# Patient Record
Sex: FEMALE | Race: OTHER | Employment: UNEMPLOYED | URBAN - METROPOLITAN AREA
[De-identification: names, ages, dates, MRNs, and addresses within clinical notes are randomized per-mention and may not be internally consistent; named-entity substitution may affect disease eponyms.]

---

## 2018-10-18 RX ORDER — CLOPIDOGREL BISULFATE 75 MG/1
75 TABLET ORAL DAILY
COMMUNITY

## 2018-10-18 RX ORDER — ACETAMINOPHEN 160 MG
1000 TABLET,DISINTEGRATING ORAL DAILY
COMMUNITY

## 2018-10-18 RX ORDER — NITROGLYCERIN 0.4 MG/1
0.4 TABLET SUBLINGUAL EVERY 5 MIN PRN
COMMUNITY

## 2018-10-18 RX ORDER — ATORVASTATIN CALCIUM 40 MG/1
20 TABLET, FILM COATED ORAL DAILY
COMMUNITY

## 2018-10-18 RX ORDER — OMEPRAZOLE 20 MG/1
20 CAPSULE, DELAYED RELEASE ORAL DAILY
COMMUNITY

## 2018-10-18 RX ORDER — AMIODARONE HYDROCHLORIDE 200 MG/1
200 TABLET ORAL DAILY
COMMUNITY

## 2018-10-18 RX ORDER — FUROSEMIDE 40 MG/1
40 TABLET ORAL DAILY
COMMUNITY

## 2018-10-22 ENCOUNTER — ANESTHESIA EVENT (OUTPATIENT)
Dept: OPERATING ROOM | Age: 68
End: 2018-10-22
Payer: MEDICARE

## 2018-10-23 ENCOUNTER — HOSPITAL ENCOUNTER (OUTPATIENT)
Age: 68
Setting detail: OUTPATIENT SURGERY
Discharge: HOME | End: 2018-10-23
Attending: OPHTHALMOLOGY | Admitting: OPHTHALMOLOGY
Payer: MEDICARE

## 2018-10-23 ENCOUNTER — ANESTHESIA (OUTPATIENT)
Dept: OPERATING ROOM | Age: 68
End: 2018-10-23
Payer: MEDICARE

## 2018-10-23 VITALS
BODY MASS INDEX: 41.82 KG/M2 | HEART RATE: 69 BPM | WEIGHT: 236 LBS | DIASTOLIC BLOOD PRESSURE: 64 MMHG | RESPIRATION RATE: 16 BRPM | HEIGHT: 63 IN | SYSTOLIC BLOOD PRESSURE: 121 MMHG | OXYGEN SATURATION: 97 % | TEMPERATURE: 97 F

## 2018-10-23 VITALS — OXYGEN SATURATION: 99 % | SYSTOLIC BLOOD PRESSURE: 138 MMHG | DIASTOLIC BLOOD PRESSURE: 66 MMHG

## 2018-10-23 LAB
EKG ATRIAL RATE: 71 BPM
EKG P AXIS: 49 DEGREES
EKG P-R INTERVAL: 176 MS
EKG Q-T INTERVAL: 438 MS
EKG QRS DURATION: 104 MS
EKG QTC CALCULATION (BAZETT): 475 MS
EKG R AXIS: -32 DEGREES
EKG T AXIS: 36 DEGREES
EKG VENTRICULAR RATE: 71 BPM
GLUCOSE BLD-MCNC: 109 MG/DL (ref 74–100)
GLUCOSE BLD-MCNC: 99 MG/DL (ref 65–105)
POC POTASSIUM: 4.2 MMOL/L (ref 3.5–4.5)

## 2018-10-23 PROCEDURE — 2580000003 HC RX 258: Performed by: ANESTHESIOLOGY

## 2018-10-23 PROCEDURE — 7100000041 HC SPAR PHASE II RECOVERY - ADDTL 15 MIN: Performed by: OPHTHALMOLOGY

## 2018-10-23 PROCEDURE — 6360000002 HC RX W HCPCS: Performed by: OPHTHALMOLOGY

## 2018-10-23 PROCEDURE — 82947 ASSAY GLUCOSE BLOOD QUANT: CPT

## 2018-10-23 PROCEDURE — 6370000000 HC RX 637 (ALT 250 FOR IP): Performed by: OPHTHALMOLOGY

## 2018-10-23 PROCEDURE — 3600000014 HC SURGERY LEVEL 4 ADDTL 15MIN: Performed by: OPHTHALMOLOGY

## 2018-10-23 PROCEDURE — 6360000002 HC RX W HCPCS: Performed by: NURSE ANESTHETIST, CERTIFIED REGISTERED

## 2018-10-23 PROCEDURE — 7100000040 HC SPAR PHASE II RECOVERY - FIRST 15 MIN: Performed by: OPHTHALMOLOGY

## 2018-10-23 PROCEDURE — 2500000003 HC RX 250 WO HCPCS: Performed by: OPHTHALMOLOGY

## 2018-10-23 PROCEDURE — 93005 ELECTROCARDIOGRAM TRACING: CPT

## 2018-10-23 PROCEDURE — 2500000003 HC RX 250 WO HCPCS: Performed by: NURSE ANESTHETIST, CERTIFIED REGISTERED

## 2018-10-23 PROCEDURE — 3700000001 HC ADD 15 MINUTES (ANESTHESIA): Performed by: OPHTHALMOLOGY

## 2018-10-23 PROCEDURE — 2709999900 HC NON-CHARGEABLE SUPPLY: Performed by: OPHTHALMOLOGY

## 2018-10-23 PROCEDURE — 3700000000 HC ANESTHESIA ATTENDED CARE: Performed by: OPHTHALMOLOGY

## 2018-10-23 PROCEDURE — 84132 ASSAY OF SERUM POTASSIUM: CPT

## 2018-10-23 PROCEDURE — 3600000004 HC SURGERY LEVEL 4 BASE: Performed by: OPHTHALMOLOGY

## 2018-10-23 RX ORDER — LIDOCAINE HYDROCHLORIDE 10 MG/ML
INJECTION, SOLUTION INFILTRATION; PERINEURAL PRN
Status: DISCONTINUED | OUTPATIENT
Start: 2018-10-23 | End: 2018-10-23 | Stop reason: SDUPTHER

## 2018-10-23 RX ORDER — ERYTHROMYCIN 5 MG/G
OINTMENT OPHTHALMIC PRN
Status: DISCONTINUED | OUTPATIENT
Start: 2018-10-23 | End: 2018-10-23 | Stop reason: HOSPADM

## 2018-10-23 RX ORDER — BUPIVACAINE HYDROCHLORIDE 7.5 MG/ML
INJECTION, SOLUTION EPIDURAL; RETROBULBAR PRN
Status: DISCONTINUED | OUTPATIENT
Start: 2018-10-23 | End: 2018-10-23 | Stop reason: HOSPADM

## 2018-10-23 RX ORDER — PROPOFOL 10 MG/ML
INJECTION, EMULSION INTRAVENOUS PRN
Status: DISCONTINUED | OUTPATIENT
Start: 2018-10-23 | End: 2018-10-23 | Stop reason: SDUPTHER

## 2018-10-23 RX ORDER — DEXAMETHASONE SODIUM PHOSPHATE 10 MG/ML
INJECTION INTRAMUSCULAR; INTRAVENOUS PRN
Status: DISCONTINUED | OUTPATIENT
Start: 2018-10-23 | End: 2018-10-23 | Stop reason: HOSPADM

## 2018-10-23 RX ORDER — CYCLOPENTOLATE HYDROCHLORIDE 10 MG/ML
1 SOLUTION/ DROPS OPHTHALMIC EVERY 5 MIN PRN
Status: COMPLETED | OUTPATIENT
Start: 2018-10-23 | End: 2018-10-23

## 2018-10-23 RX ORDER — CEFAZOLIN SODIUM 1 G/3ML
INJECTION, POWDER, FOR SOLUTION INTRAMUSCULAR; INTRAVENOUS PRN
Status: DISCONTINUED | OUTPATIENT
Start: 2018-10-23 | End: 2018-10-23 | Stop reason: HOSPADM

## 2018-10-23 RX ORDER — SODIUM CHLORIDE, SODIUM LACTATE, POTASSIUM CHLORIDE, CALCIUM CHLORIDE 600; 310; 30; 20 MG/100ML; MG/100ML; MG/100ML; MG/100ML
INJECTION, SOLUTION INTRAVENOUS CONTINUOUS
Status: DISCONTINUED | OUTPATIENT
Start: 2018-10-23 | End: 2018-10-23 | Stop reason: HOSPADM

## 2018-10-23 RX ORDER — CYCLOPENTOLATE HYDROCHLORIDE 10 MG/ML
SOLUTION/ DROPS OPHTHALMIC PRN
Status: DISCONTINUED | OUTPATIENT
Start: 2018-10-23 | End: 2018-10-23 | Stop reason: HOSPADM

## 2018-10-23 RX ORDER — LIDOCAINE HYDROCHLORIDE 20 MG/ML
INJECTION, SOLUTION INFILTRATION; PERINEURAL PRN
Status: DISCONTINUED | OUTPATIENT
Start: 2018-10-23 | End: 2018-10-23 | Stop reason: HOSPADM

## 2018-10-23 RX ORDER — PHENYLEPHRINE HYDROCHLORIDE 100 MG/ML
1 SOLUTION/ DROPS OPHTHALMIC EVERY 5 MIN PRN
Status: COMPLETED | OUTPATIENT
Start: 2018-10-23 | End: 2018-10-23

## 2018-10-23 RX ADMIN — PHENYLEPHRINE HYDROCHLORIDE 1 DROP: 100 SOLUTION/ DROPS OPHTHALMIC at 09:58

## 2018-10-23 RX ADMIN — SODIUM CHLORIDE, POTASSIUM CHLORIDE, SODIUM LACTATE AND CALCIUM CHLORIDE: 600; 310; 30; 20 INJECTION, SOLUTION INTRAVENOUS at 11:09

## 2018-10-23 RX ADMIN — PHENYLEPHRINE HYDROCHLORIDE 1 DROP: 100 SOLUTION/ DROPS OPHTHALMIC at 10:01

## 2018-10-23 RX ADMIN — CYCLOPENTOLATE HYDROCHLORIDE 1 DROP: 10 SOLUTION/ DROPS OPHTHALMIC at 10:08

## 2018-10-23 RX ADMIN — GENTAMICIN, PREDNISOLONE ACETATE 1 DROP: 3; 10 SUSPENSION/ DROPS OPHTHALMIC at 09:56

## 2018-10-23 RX ADMIN — CYCLOPENTOLATE HYDROCHLORIDE 1 DROP: 10 SOLUTION/ DROPS OPHTHALMIC at 09:59

## 2018-10-23 RX ADMIN — PHENYLEPHRINE HYDROCHLORIDE 1 DROP: 100 SOLUTION/ DROPS OPHTHALMIC at 10:05

## 2018-10-23 RX ADMIN — SODIUM CHLORIDE, POTASSIUM CHLORIDE, SODIUM LACTATE AND CALCIUM CHLORIDE: 600; 310; 30; 20 INJECTION, SOLUTION INTRAVENOUS at 09:56

## 2018-10-23 RX ADMIN — CYCLOPENTOLATE HYDROCHLORIDE 1 DROP: 10 SOLUTION/ DROPS OPHTHALMIC at 10:04

## 2018-10-23 RX ADMIN — PROPOFOL 50 MG: 10 INJECTION, EMULSION INTRAVENOUS at 11:17

## 2018-10-23 RX ADMIN — LIDOCAINE HYDROCHLORIDE 40 MG: 10 INJECTION, SOLUTION INFILTRATION; PERINEURAL at 11:17

## 2018-10-23 ASSESSMENT — PULMONARY FUNCTION TESTS
PIF_VALUE: 0
PIF_VALUE: 3
PIF_VALUE: 0

## 2018-10-23 ASSESSMENT — PAIN SCALES - GENERAL
PAINLEVEL_OUTOF10: 0

## 2018-10-23 ASSESSMENT — PAIN - FUNCTIONAL ASSESSMENT: PAIN_FUNCTIONAL_ASSESSMENT: 0-10

## 2018-10-23 NOTE — PROGRESS NOTES
Patient has sisters with her that drove separate. Please call Ary Cornejo [phone] 30 minutes prior to patient needing picked up.  Patient lives with sister

## 2018-10-23 NOTE — BRIEF OP NOTE
Brief Postoperative Note  ______________________________________________________________    Patient: Jose Antonio Mei  YOB: 1950  MRN: [id number]  Date of Procedure: 10/23/2018    Pre-Op Diagnosis: VITREOUS HEMORRHAGE LEFT EYE     Post-Op Diagnosis: Same       Procedure(s):  VITRECTOMY 25 GAUGE PANRETINAL LASER, AIR FLUID EXCHANGE, ENDOLASER 200 MS, 200 MW, 1375 SPOTS    Anesthesia: Monitor Anesthesia Care    Surgeon(s):  Olimpia Vázquez MD    Staff:  Karma Scrub: Roma Perry  Scrub Person First: Mario Thomas  Scrub Person Second: Bryan Wallace; Darroll Dandy, RN    Complications: none    Olimpia Vázquez MD  Date: 10/23/2018  Time: 12:34 PM

## 2018-10-23 NOTE — H&P
History and Physical    Pt Name: Soheila Rendon  MRN: [id number]  YOB: 1950  Date of evaluation: 10/23/2018  Primary Care Physician: No primary care provider on file. Patient evaluated at the request of  Dr. Cristine Kussmaul    Reason for evaluation: left eye vitreous hemorrhage  SUBJECTIVE:   History of Chief Complaint:      Omid Goodman is a 79 y.o. female          who presents with a hx of visual  changes that started approx   2 weeks ago in    Her left   eye ,   DM x  5 yrs , sees a blob of blood in her left eye she reports           Past Medical History      has a past medical history of Acute renal failure (ARF) ([address] Anemia; Arthritis; Atrial fibrillation ([address] CAD (coronary artery disease); Cataract; Chronic renal failure; Diabetes mellitus ([address] Full dentures; Hypercholesterolemia; Hyperkalemia; Hypertensive disorder; Neck sprain; Pancreatitis; Streptococcal bacteremia; Vision changes; and Wears glasses. Past Surgical History   has a past surgical history that includes Cataract removal (Bilateral); eye surgery; and Cardiac catheterization (2017). Medications   Scheduled Meds:  Continuous Infusions:  PRN Meds:. Allergies  is allergic to vancomycin. Family History    family history includes Diabetes in her maternal grandmother. Family Status   Relation Status    MGM (Not Specified)         Social History  Social History     Social History    Marital status:      Spouse name: N/A    Number of children: N/A    Years of education: N/A     Occupational History    Not on file. Social History Main Topics    Smoking status: Never Smoker    Smokeless tobacco: Never Used    Alcohol use No    Drug use: No    Sexual activity: Not on file     Other Topics Concern    Not on file     Social History Narrative    No narrative on file               Hobbies:  Is a grandmother     OBJECTIVE:   VITALS:  height is 5' 3\" (1.6 m) and weight is 236 lb (107 kg).      CONSTITUTIONAL:

## 2018-10-24 NOTE — OP NOTE
lidocaine  and Marcaine was given beneath the left eye as a retrobulbar block. This resulted in excellent anesthesia and akinesia. The left eye was  prepped and draped for usual sterile surgery. A lid speculum was  placed. Peritomies were performed 3.5 mm posterior to the limbus  using bevelled cannulas. The inferotemporal cannula received the  infusion line which was found to be well located in the vitreous  chamber before turning the infusion on. The two superior cannulas  were used for the light pipe and the vitrector. These instruments  were introduced into the eye and the vitreous was noted to be  extremely hazy with white and red blood. The vitreous chamber was  obviously filled for quite sometime with blood based on the color and  organization of the vitreous which was quite opaque and thickened. The vitreous was trimmed out to the periphery as best as possible  leaving a white opacified vitreous skirt. Attention was drawn to the posterior pole where there was red blood  layered on the macula and optic nerve. This was suctioned off without  difficulty. There was an adhesive pigmented old clot versus membrane  that was vacuumed off the surface of the inferior temporal arcade  vessels. There was a suspicion of neovascularization on the optic  disc on inspection with the contact lens after removing blood. There  was also an area of vascular elevation in the superotemporal mid  periphery that appeared to be possible neovascularization, but there  was a bit of blood caked on its surface. This was surrounded with  laser and treated directly. There was no sign of retinal detachment  anywhere. Once the blood was off the retina, endolaser was delivered  in panretinal photocoagulation fashion for a total of 1375 spots. An  air-fluid exchange was carried out to remove the suspended red blood  cells, which were mainly grayish white because of its chronicity.    Once the air-fluid exchange was done,

## 2020-01-01 ENCOUNTER — OUTSIDE SERVICES (OUTPATIENT)
Dept: FAMILY MEDICINE CLINIC | Age: 70
End: 2020-01-01
Payer: MEDICARE

## 2020-01-01 LAB
BASOPHILS %: 0.73 (ref 0–3)
BASOPHILS ABSOLUTE: 0.07 (ref 0–0.3)
EOSINOPHILS %: 0.19 (ref 0–10)
EOSINOPHILS ABSOLUTE: 0.02 (ref 0–1.1)
HCT VFR BLD CALC: 21.3 % (ref 37–47)
HEMOGLOBIN: 6.4 (ref 12–16)
LYMPHOCYTE %: 7.46 (ref 20–51.1)
LYMPHOCYTES ABSOLUTE: 0.69 (ref 1–5.5)
MCH RBC QN AUTO: 29.6 PG (ref 28.5–32.5)
MCHC RBC AUTO-ENTMCNC: 29.8 G/DL (ref 32–37)
MCV RBC AUTO: 99 FL (ref 80–94)
MONOCYTES %: 7.95 (ref 1.7–9.3)
MONOCYTES ABSOLUTE: 0.74 (ref 0.1–1)
NEUTROPHILS %: 83.67 (ref 42.2–75.2)
NEUTROPHILS ABSOLUTE: 7.75 (ref 2–8.1)
PDW BLD-RTO: 19.7 % (ref 8.5–15.5)
PLATELET # BLD: 122.9 THOU/MM3 (ref 130–400)
RBC: 2.15 M/UL (ref 4.2–5.4)
WBC: 9.3 THOU/ML3 (ref 4.8–10.8)

## 2020-01-01 PROCEDURE — 99306 1ST NF CARE HIGH MDM 50: CPT | Performed by: FAMILY MEDICINE

## (undated) DEVICE — GOWN,AURORA,NONREINFORCED,LARGE: Brand: MEDLINE

## (undated) DEVICE — SOLUTION IRRIG 1000ML PENTALYTE PLAS POUR BTL TIS U SOL

## (undated) DEVICE — SUTURE VCRL SZ 7-0 L18IN ABSRB VLT L6.5MM TG140-8 3/8 CIR J546G

## (undated) DEVICE — 1 ML TUBERCULIN SYRINGE LUER-LOCK TIP: Brand: MONOJECT

## (undated) DEVICE — 1 EACH 40411 STERILE DISPOSABLE SUPER VIEW® LENS SET & 1 EACH 40100 STERILE MICROSCOPE DRAPE: Brand: SUPER VIEW® PACK

## (undated) DEVICE — GLOVE ORANGE PI 7 1/2   MSG9075

## (undated) DEVICE — HYPODERMIC SAFETY NEEDLE: Brand: MAGELLAN

## (undated) DEVICE — 25GA EZPASS SOFT TIP CANNULA BOX OF 5: Brand: VORTEX SURGICAL INC

## (undated) DEVICE — SURGICAL PROCEDURE PACK 25 GA VITRECTOMY

## (undated) DEVICE — RETINA PK

## (undated) DEVICE — GOWN,SURGICAL,AURORA,SLEEVE: Brand: MEDLINE

## (undated) DEVICE — OCCLUDER EYE POLYETH HYPOALRG ADH TAPE W/O PINHOLE

## (undated) DEVICE — Z DISCONTINUED NO SUB IDED SPONGE OPHTH EYE SPEAR SURG STRL

## (undated) DEVICE — 25 GA FLEXIBLE TIP LASER PROBE: Brand: ALCON, ENGAUGE